# Patient Record
Sex: MALE | Race: WHITE | Employment: OTHER | URBAN - METROPOLITAN AREA
[De-identification: names, ages, dates, MRNs, and addresses within clinical notes are randomized per-mention and may not be internally consistent; named-entity substitution may affect disease eponyms.]

---

## 2017-11-13 ENCOUNTER — HOSPITAL ENCOUNTER (OUTPATIENT)
Dept: CARDIAC REHAB | Age: 77
Discharge: HOME | End: 2017-11-30
Attending: INTERNAL MEDICINE | Admitting: INTERNAL MEDICINE

## 2017-11-13 RX ORDER — CLOPIDOGREL BISULFATE 75 MG/1
75 TABLET ORAL DAILY
COMMUNITY

## 2017-11-13 RX ORDER — NIACIN 250 MG
250 TABLET ORAL
COMMUNITY

## 2017-11-13 NOTE — PROGRESS NOTES
[facility]  Phase 2 Cardiac Rehabilitation  Individualized Cardiac Treatment Plan    Patient Name:  Shilpa Adams :  1940 Age:  78 y.o. Account Number:  [de-identified]  Diagnosis:   CAD, PTCA, Stent  RCA - 100%    Date of Event: 2017  Physician:  Rajeev Arrieta      Next Office Visit:         Risk Stratifications: ()Low (   X )Intermediate ()High    FamilySophronia Rucks    [id number]  Allergies: Allergies   Allergen Reactions    Alcohol      Recovering  alcoholic    Azithromycin Nausea Only     Z-pack       . Primary Diagnosis    CAD, PTCA, Stent  And 100%   RCA    Cardiac History  History of symptoms related to cardiac event. (Note frequency, duration, location, radiation, quality, predisposing factors, other symptoms and what relieved symptoms)     Mr. Mallory Mireles presents today for initial evaulation into Cardiac Rehab phase 2 program.  His cardiac history is as follows: he had 3 small bouts of chest tightness that he did nothing about. All occurring about 3 weeks prior to event on 2017 when he was [place] the golf [place] and had chest pain ( mild burning sensation ) and was taken by a friend to the ED. By the time he presented to the hospital  The chest pain had resolved. Pt underwent coronary angiogram and was found to have high-grade RCA lesion which was treated with placement of drug- eluding stent. He was out playing golf again the following  Monday with no complaints.               []  MI              []  CABG         [x]  PTCA            []  Valve Replacement    []  Arrhythmia    []  CHF   Last Admission:   [] Pacemaker        []  ICD   []  Other     Ejection fraction         Physical Assessment -   Alert and oriented    General Appearance   Color: [x]  Natural [] Pale ( ) Cyanotic []  Flushed [] Jaundice   Skin Integrity:  Intact   Incision(s) where:  NONE Hx of infection at incision(s) site [x] No  [] Yes walked:       feet  Mets:  Max. HR.      BPM  RPE:   Rhythm:  % changed:     Fall Risk/Other  Fall risk assessed (x)yes   Issue:   NONE  Orthopedic problems ()yes ()no  Walker () Cane()   Safety issues reviewed  (x)yes   If patient has balance or orthopedic issue, action:      EXERCISE PLAN Exercise Plan EXERCISE PLAN EXERCISE PLAN EXERCISE PLAN   Interventions Interventions Interventions Interventions Interventions   Exercise Prescription/Order   Exercise Prescription/Order Exercise Prescription/order Exercise   Prescription/Order Discharge Exercise Plan                Mode       1. TM at 2.0  mph for 15-20  min at 2.5   mets  2. NS at   1-2 for 10 min at 2.0 mets  3. UBE at0-5  for 10 min   Mode      TM  NS  Ellipt/Arc  Bike  UBE  Scifit     Mode      TM  NS  Ellipt/Arc  Bike  UBE  Scifit     Mode      TM  NS  Ellipt/Arc  Bike  UBE  Scifit   Continue independent exercise [] Y    Continue in Phase IV [] Y    Aerobic Mode:     Frequency: 2-3  Week  Duration: 15-30 min  Intensity:  THR___or RPE 11-12     Frequency:   3 x week   Duration: 20-30 min  Intensity:   THR ___or RPE 11-13     Frequency: 3 x week  Duration: 20-40 min. Intensity:   THR ___ or RPE 11-14     Frequency: 3 x week  Duration: 30-45 min  Intensity:  THR ___or RPE 11-14 Frequency:      Exercise 3-5 days per week. [] yes[] no  []   30+ min. Of exercise per session    [] yes [] no     Progression:  Depending on patient condition, time and intensity will be increased. An initial met level< 3 is classified as \"light\". Progression to \"moderate\" 3-6 mets or higher for patients in better physical condition. Resistance Training  [] yes  [] no         Max. Met level:    Session #:    Resistance Training  [] yes  [] no  Type:    Symptoms with Exercise[] yes [] no Max. Met Level:    Session#:    Resistance Training  [] yes [] no  Type:    Symptoms with Exercise[] yes[] no   Max. Met.  Level:    Session #:    Resistance Training  [] yes [] no  Type:    Symptoms [] Normal  [x] Overweight ()Obese    [] Recent gain:    [] Recent loss:   Patients goal weight:   185.0    Weight Management  Weight:                         Weight Management  Weight:                       Weight   Management  Weight:         Weight Management  Weight:                  Height:    BMI:    Diet  Current Diet:  Low salt/  Low fat / low carb      Diet  Dietary Improvements:   Diabetes:   [x] Y  [] N   Type 2 Diet controlled takes no medications for it and does not check BS at home  FBS      [id number] /date  Checks BS at home   [] Y  [x] N  Frequency   Range   Medications  NONE  Low BS Reaction   []  To check BS first 6 sessions before/after exercise   []  To carry snack for low BS   Most recent BS    [] Y  [] N Any medication changes   [] Y  [] N Problems with low sugar or high sugars with exercise. Treatment: Most recent BS    [] Y  [] N Any medication changes Most recent BS    [] Y  [] N Any medication changes Most recent BS   [] Y  [] N Any medication changes   Lipids  Hyperlipidemia  [x] Y  [] N    09/21/2017  Total Chol: 166  HDL: 133  LDL: 116  Triglycerides: 85  Current Tx:   Lipitor  80 mg          [] Y [] N Medication changes? [] Y  [] N Recent Lipids   [] Y [] N Medication changes? [] Y [] N Recent Lipids  [] Y [] N Medication changes? [] Y [] N Recent Lipids  [] Y  [] N Medication changes? [] Y  [] N Recent lipids   Diet Assessment Tool:  Rate your plate survey  NDYYJ=80/78  Score of 55-69 is excellent. Diet Assessment Tool:  Rate your plate survey  Score:    /69  Score of 55-69 is excellent. NUTRITION PLAN NUTRITION PLAN NUTRITION PLAN NUTRITION PLAN NUTRITION PLAN   *Interventions* *Interventions* *Interventions* *Interventions* *Interventions*   Professional Referral  Please check if needed.    [] Dietician Consult    [] Diabetes Referral Professional Referral   []  Seen by dietician for   Consult/referral   []  Diabetes referral  [] Seen by dietician for consult/ referral   []  Seen for Diabetes Referral   Has patient completed consult if needed? [] Y [] N Met with dietician   [] Y  [] N Met with diabetes educator   Nutrition Education Nutrition Education Nutrition Education Nutrition Education Nutrition Education    [x] Individual Nutrition Counseling by staff/dietician    []  Individual Nutrition Counseling   []  Diabetic education if needed    Education Classes by dietician  1. []  Nutrients and Portion control  2. [] Fats & Fiber  3. []  Sodium, Dash & Mediterranean Diet               Education Classes by dietician  1. [] Nutrients & Portion Control  2. [] Fats & Fibers  3. []  Sodium, Dash and Mediterranean Diet           Education Classes by Dietician  1. [] Nutrients & Portion Control  2. [] Fat & Fibers  3. [] Sodium, Dash and Mediterranean Diet   Patient has knowledge of:   [] Y  [] N Heart Healthy diet   [] Y [] N Nutritional guidelines    [] Y  [] N Diabetic diet      Goals Goals Reassessed Goals Reassessed Goals Reassessed Goals   Initial Nutritional Goals Set (x)Yes  ()No Previous Nutritional Goals Met?  ()Yes  ()No Previous Nutritional Goals Met?  ()Yes  ()No Previous Nutritional Goals Met?  ()Yes  ()No Previous Nutritional Goals Met?  ()Yes  ()No   Janusz's nutritional goals are as follows: Individual goals -  1. Switch from general cold cuts to oven roasted turkey products. 2.   Eat 2-4 servings of fruits and vegs. Daily    3. Learn more about reading lables on packages to be more informed about sodium and fats in products. 4.  Eat a meatless main dish occasionally. Long term:  1. Improved Rate your plate score  2. Improved glucose control Review goals/ Revised:             Review goals/Revised:             Review goals/Revised:                   Long Term:  1. Improved Rate your plate score  [] yes  2.  Improved glucose control  [] yes   Individual Cardiac Treatment Plan  Psychosocial  PSYCHOSOCIAL  ASSESSMENT/PLAN PSYCHOSOCIAL  REASSESSMENT follows:    Pt. doesn't feel need to set these presently. Improved PHQ9 score  Improved Mental Health  Score               [] Improved PHQ9 score   [] Improved Mental Health score     Individual Cardiac Treatment Plan  Other:  Risk Factor/Education  CORE MEASURE  ASSESSMENT/PLAN CORE MEASURE  REASSESSMENT  CORE MEASURE  REASSESSMENT CORE MEASURE  REASSESSMENT CORE MEASURE  DISCHARGE/FOLLOW-UP   CORE MEASURE ASSESSMENT CORE MEASURE REASSESSMENT CORE MEASURE REASSESSMENT CORE MEASURE REASSESSMENT CORE MEASURE  Discharge   Hypertension   [x]Yes []No  Resting BP:  149/71  Peak Ex BP:163/74   See medication list.   Hypertension    Resting BP:   Peak Ex BP:  Medication Changes:  []Yes []No   Hypertension    Resting BP:   Peak Ex BP:  Medication Changes:  []Yes []No     Hypertension    Resting BP:   Peak Ex BP:  Medication Changes:  []Yes []No    Hypertension    Resting BP:   Peak Ex BP:  Medication Changes:  []Yes []No     Tobacco Use  []Current [x]Former []Never    Years smoked: 50 years   Date Quit: 02/19/2013  Quit date set: []Yes []No  Date:   # cigarettes one ppd for 50 years, quit when he found out he had lung cancer   :  Smokeless Tobacco use:   []Yes  [x]No  Amount:  Tobacco Use  Change in smoking status           Quit date:    Tobacco Use  Change in smoking status           Quit date:    Tobacco Use  Change in smoking status           Quit date:     Tobacco Use  Change in smoking status           Quit date:      Heart Health Knowledge   Test Score: 14 /15        Heart Health Knowledge   Test Score:    /15        Learning Barriers  Please select one:  []Speech  []Literacy  [] Hearing  []Cognitive  [] Vision  [x]Ready to Learn Learning Barriers addressed:[] Y[] N  Modifications:           Other Core Measures EDUCATION PLAN Other Core Measures EDUCATION PLAN Other Core Measures EDUCATION PLAN Other Core Measures EDUCATION PLAN Other Core Measure EDUCATION PLAN   Interventions Interventions Interventions Interventions Interventions   Cardiac Risk Factor Education Needed      [x]HTN              [] Attended Education Class on Risk Factors for Heart Disease  [] Home B/P monitoring  [] Dietary Sodium Restriction      []Attended Education Class on Risk Factors for Heart Disease  [] Home B/P monitoring  [] Dietary Sodium Restriction          []Attended Education Class on Risk Factors for Heart Disease    []Home B/P monitoring  [] Dietary Sodium Restriction  Janusz voices 1. Understanding of risks for heart disease and how to modify their risk. 2. Understanding of importance of restricting sodium in diet. []Y [x]N  Smoking Cessation counseling needed  []Y [x]N Pt verbalizes readiness to quit smoking?  []Y[x] () N Quit date set  []Y [x]N Cut down cigarettes  SMOKE FREE SINCE   02- []One on one counseling on Tobacco Cessation  [] Attend Smoking Cessation classes    []Smoking Cessation Information given  []Smoking cessation medications used:   [] One on one counseling on Tobacco Cessation. [] Attend smoking cessation classes      []Smoking cessation medications used:   [] One on one counseling on Tobacco Cessation. [] Attend smoking cessation classes        []Smoking cessation medications used: Tobacco Cessation Counseling attended  []Yes  []No  If not completed, Why? Core Measure Education Core Measure Education Core Measure Education Core Measure Education Education   [x] Cardiac Rehab Education booklet given  [x] Cardiac Rehab education class list given Attended Education Classes:  1. []  A & P of the  Heart & Body  2. []Heart Disease  3. [] Risk Factors  4. []  Cardiac Medications  5. [] Cardiac Interventions Attended Education Classes:  1. []  A & P of the  Heart & Body  2. [] Heart Disease  3. []  Risk Factors  4.[] ()  Cardiac Medications  5. [] Cardiac Interventions Attended Education Classes:  1. [] A & P of the  Heart & Body  2. []Heart Disease  3. [] Risk Factors  4. [] Cardiac Medications  5. []Cardiac Interventions Attend all the education classes. *Goals* Goal Reassessment Goal Reassessment Goal Reassessment *Goals*   Amelia Initial Risk factor/education  goals are as follows:  1. To feel stronger and increase energy. 2.  Continue his home exercise program of walking a mile a day  2-3 times per week. 3.  To golf when ever he can. Resting B/P < 140/90 or 130/80 if DM or CKD  [] Y [x]N Complete tobacco cessation  [x]Y []N Understanding risks of heart disease  []Y [x]N Heart failure self management     Goal Progress:    Goal Progress:     Goal Progress: Carlos Mcclure has met goals ()yes         Physician Response  [x]Initiate Individualized Treatment Plan (ITP)  []Other   Physician Response  [] Proceed with rehab and 30 day updates per ITP. []Other     Physician Response  [] Proceed with rehab and 30 day updates per ITP. []Other   Physician Response  [] Proceed with rehab and 30 day updates per ITP. []Other    Physician Final Signature     Comments:-    11-  Initial evaluation for Cardiac rehab phase 2. Mr. 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 While walking on the golf course he developed mild left chest pain described as a dull burning sensation and numbness and discomfort in his left arm. He when back to the car and by the time he got to hospital the pain was resolved. 9-  Went to coronary angiogram and had high-grade RCA lesion which was treated with placement of drug-eluting stent. He has done well since with no further symptoms. He underwent a 6 minute walk for 1250 feet and tolerated it well with telemetry showing SR. No dyspnea, dizziness or discomfort.   Will provide education about risk factor modification and support toward goal attainment. Will continue to progress exercise as tolerated.   Rachel Mena RN  11/13/2017      Physician's Signature: _________________ Date:________

## 2017-12-01 ENCOUNTER — HOSPITAL ENCOUNTER (OUTPATIENT)
Dept: OTHER | Age: 77
Discharge: HOME | End: 2017-12-31
Attending: INTERNAL MEDICINE | Admitting: INTERNAL MEDICINE

## 2018-01-01 ENCOUNTER — HOSPITAL ENCOUNTER (OUTPATIENT)
Dept: OTHER | Age: 78
Discharge: HOME | End: 2018-01-31
Attending: INTERNAL MEDICINE | Admitting: INTERNAL MEDICINE

## 2018-02-01 ENCOUNTER — HOSPITAL ENCOUNTER (OUTPATIENT)
Dept: OTHER | Age: 78
Discharge: HOME | End: 2018-02-28
Attending: INTERNAL MEDICINE | Admitting: INTERNAL MEDICINE

## 2018-03-01 ENCOUNTER — HOSPITAL ENCOUNTER (OUTPATIENT)
Dept: OTHER | Age: 78
Discharge: HOME | End: 2018-03-31
Attending: INTERNAL MEDICINE | Admitting: INTERNAL MEDICINE

## 2024-05-07 DIAGNOSIS — M25.561 RIGHT KNEE PAIN, UNSPECIFIED CHRONICITY: Primary | ICD-10-CM

## 2024-05-08 ENCOUNTER — HOSPITAL ENCOUNTER (OUTPATIENT)
Age: 84
Discharge: HOME | End: 2024-05-08
Payer: MEDICARE

## 2024-05-08 ENCOUNTER — OFFICE VISIT (OUTPATIENT)
Age: 84
End: 2024-05-08
Payer: MEDICARE

## 2024-05-08 VITALS — HEIGHT: 71 IN | WEIGHT: 195 LBS | BODY MASS INDEX: 27.3 KG/M2

## 2024-05-08 DIAGNOSIS — M25.561 RIGHT KNEE PAIN, UNSPECIFIED CHRONICITY: ICD-10-CM

## 2024-05-08 DIAGNOSIS — M17.11 PRIMARY OSTEOARTHRITIS OF RIGHT KNEE: Primary | ICD-10-CM

## 2024-05-08 PROCEDURE — G8427 DOCREV CUR MEDS BY ELIG CLIN: HCPCS | Performed by: ORTHOPAEDIC SURGERY

## 2024-05-08 PROCEDURE — 73564 X-RAY EXAM KNEE 4 OR MORE: CPT

## 2024-05-08 PROCEDURE — 20610 DRAIN/INJ JOINT/BURSA W/O US: CPT | Performed by: ORTHOPAEDIC SURGERY

## 2024-05-08 PROCEDURE — 1123F ACP DISCUSS/DSCN MKR DOCD: CPT | Performed by: ORTHOPAEDIC SURGERY

## 2024-05-08 PROCEDURE — 1036F TOBACCO NON-USER: CPT | Performed by: ORTHOPAEDIC SURGERY

## 2024-05-08 PROCEDURE — 99203 OFFICE O/P NEW LOW 30 MIN: CPT | Performed by: ORTHOPAEDIC SURGERY

## 2024-05-08 PROCEDURE — G8419 CALC BMI OUT NRM PARAM NOF/U: HCPCS | Performed by: ORTHOPAEDIC SURGERY

## 2024-05-08 RX ORDER — ALBUTEROL SULFATE 90 UG/1
2 AEROSOL, METERED RESPIRATORY (INHALATION) EVERY 6 HOURS PRN
COMMUNITY
Start: 2024-02-09

## 2024-05-08 RX ORDER — ACETAMINOPHEN 500 MG
1500 TABLET ORAL NIGHTLY
COMMUNITY
Start: 2019-09-26

## 2024-05-08 RX ORDER — PEAK FLOW METER
EACH MISCELLANEOUS
COMMUNITY
Start: 2024-02-12

## 2024-05-08 RX ORDER — DEXAMETHASONE 4 MG/1
TABLET ORAL
COMMUNITY
Start: 2024-02-08 | End: 2024-05-22

## 2024-05-08 RX ORDER — BETAMETHASONE SODIUM PHOSPHATE AND BETAMETHASONE ACETATE 3; 3 MG/ML; MG/ML
12 INJECTION, SUSPENSION INTRA-ARTICULAR; INTRALESIONAL; INTRAMUSCULAR; SOFT TISSUE ONCE
Status: COMPLETED | OUTPATIENT
Start: 2024-05-08 | End: 2024-05-08

## 2024-05-08 RX ORDER — CEFDINIR 300 MG/1
CAPSULE ORAL
COMMUNITY
Start: 2024-02-09

## 2024-05-08 RX ORDER — DOXYCYCLINE HYCLATE 100 MG
TABLET ORAL
COMMUNITY
Start: 2024-02-19

## 2024-05-08 RX ORDER — IPRATROPIUM BROMIDE AND ALBUTEROL SULFATE 2.5; .5 MG/3ML; MG/3ML
3 SOLUTION RESPIRATORY (INHALATION) EVERY 4 HOURS PRN
COMMUNITY
Start: 2024-04-24

## 2024-05-08 RX ORDER — LISINOPRIL AND HYDROCHLOROTHIAZIDE 20; 12.5 MG/1; MG/1
1 TABLET ORAL 2 TIMES DAILY
COMMUNITY
Start: 2019-09-26

## 2024-05-08 RX ORDER — METHYLPREDNISOLONE 4 MG/1
TABLET ORAL
COMMUNITY
Start: 2024-02-19

## 2024-05-08 RX ORDER — LIDOCAINE HYDROCHLORIDE 10 MG/ML
5 INJECTION, SOLUTION INFILTRATION; PERINEURAL ONCE
Status: COMPLETED | OUTPATIENT
Start: 2024-05-08 | End: 2024-05-08

## 2024-05-08 RX ORDER — FOLIC ACID 1 MG/1
1 TABLET ORAL DAILY
COMMUNITY
Start: 2023-09-20

## 2024-05-08 RX ORDER — CLOTRIMAZOLE AND BETAMETHASONE DIPROPIONATE 10; .64 MG/G; MG/G
1 CREAM TOPICAL
COMMUNITY
Start: 2024-04-08

## 2024-05-08 RX ORDER — ATORVASTATIN CALCIUM 80 MG/1
80 TABLET, FILM COATED ORAL NIGHTLY
COMMUNITY
Start: 2024-03-16

## 2024-05-08 RX ADMIN — BETAMETHASONE SODIUM PHOSPHATE AND BETAMETHASONE ACETATE 12 MG: 3; 3 INJECTION, SUSPENSION INTRA-ARTICULAR; INTRALESIONAL; INTRAMUSCULAR; SOFT TISSUE at 15:47

## 2024-05-08 RX ADMIN — LIDOCAINE HYDROCHLORIDE 5 ML: 10 INJECTION, SOLUTION INFILTRATION; PERINEURAL at 15:47

## 2024-05-08 NOTE — PROGRESS NOTES
mouth daily      ipratropium 0.5 mg-albuterol 2.5 mg (DUONEB) 0.5-2.5 (3) MG/3ML SOLN nebulizer solution Inhale 3 mLs into the lungs every 4 hours as needed      lisinopril-hydroCHLOROthiazide (PRINZIDE;ZESTORETIC) 20-12.5 MG per tablet Take 1 tablet by mouth 2 times daily      methylPREDNISolone (MEDROL DOSEPACK) 4 MG tablet FOLLOW PACKAGE DIRECTIONS      Nebulizers (VIOS AEROSOL DELIVERY SYSTEM) MISC USE AS DIRECTED      Multiple Vitamin (MULTIVITAMINS PO) Take by mouth      niacin 250 MG tablet Take 1 tablet by mouth      aspirin 81 MG tablet Take 1 tablet by mouth daily      clopidogrel (PLAVIX) 75 MG tablet Take 1 tablet by mouth daily      metoprolol tartrate (LOPRESSOR) 25 MG tablet Take 1 tablet by mouth 2 times daily      ascorbic acid (VITAMIN C) 500 MG tablet Take 1 tablet by mouth daily      gemfibrozil (LOPID) 600 MG tablet Take 1 tablet by mouth 2 times daily (before meals)      pantoprazole sodium (PROTONIX) 40 MG PACK packet Take 1 packet by mouth in the morning and 1 packet in the evening.      tiotropium (SPIRIVA) 18 MCG inhalation capsule Inhale 1 capsule into the lungs daily      temazepam (RESTORIL) 15 MG capsule Take 1 capsule by mouth nightly as needed for Sleep.       No current facility-administered medications for this visit.      allergies, social and family histories were reviewed and updated as appropriate.    Review of Systems:  Relevant review of systems reviewed and available in the patient's chart and scanned in under the MEDIA tab today.    Vital Signs:  Ht 1.803 m (5' 11\")   Wt 88.5 kg (195 lb)   BMI 27.20 kg/m²     General Exam:   Constitutional: He is adequately groomed with no evidence of malnutrition, obesity absent  Mental Status: He is oriented to time, place and person. Normal mentation and affect for age.  Lymphatic: The lymphatic examination bilaterally reveals all areas to be without enlargement or induration.  Vascular: Examination reveals no swelling or calf

## 2024-05-31 ENCOUNTER — OFFICE VISIT (OUTPATIENT)
Dept: ORTHOPEDIC SURGERY | Age: 84
End: 2024-05-31

## 2024-05-31 DIAGNOSIS — M17.11 PRIMARY OSTEOARTHRITIS OF RIGHT KNEE: Primary | ICD-10-CM

## 2024-05-31 RX ORDER — LIDOCAINE HYDROCHLORIDE 10 MG/ML
5 INJECTION, SOLUTION INFILTRATION; PERINEURAL ONCE
Status: COMPLETED | OUTPATIENT
Start: 2024-05-31 | End: 2024-05-31

## 2024-05-31 RX ORDER — METHYLPREDNISOLONE 4 MG/1
TABLET ORAL
Qty: 1 KIT | Refills: 0 | Status: SHIPPED | OUTPATIENT
Start: 2024-05-31

## 2024-05-31 RX ORDER — BETAMETHASONE SODIUM PHOSPHATE AND BETAMETHASONE ACETATE 3; 3 MG/ML; MG/ML
12 INJECTION, SUSPENSION INTRA-ARTICULAR; INTRALESIONAL; INTRAMUSCULAR; SOFT TISSUE ONCE
Status: COMPLETED | OUTPATIENT
Start: 2024-05-31 | End: 2024-05-31

## 2024-05-31 RX ADMIN — BETAMETHASONE SODIUM PHOSPHATE AND BETAMETHASONE ACETATE 12 MG: 3; 3 INJECTION, SUSPENSION INTRA-ARTICULAR; INTRALESIONAL; INTRAMUSCULAR; SOFT TISSUE at 10:37

## 2024-05-31 RX ADMIN — LIDOCAINE HYDROCHLORIDE 5 ML: 10 INJECTION, SOLUTION INFILTRATION; PERINEURAL at 10:36

## 2024-06-01 NOTE — PROGRESS NOTES
Janusz Childs  [id number]  Encounter Date: 5/31/2024  YOB: 1940    Chief Complaint   Patient presents with    Follow-up     Right knee pain       History:Mr. Janusz Childs is here in follow up regarding his right knee pain.  He feels like the injection from May 8 significantly helped for about 2 weeks but has started to wear off.  Pain level was up to a 5/10 with activity now.  He has a fishing trip that is a flight and trip to [place] coming up next week and he is requesting a repeat injection to help get through that trip.  No new injuries since his last visit.    Exam:  There were no vitals taken for this visit.  General: Alert and oriented x3, No acute distress, Cooperative and conversant.  Mood and affect are appropriate.  Right Knee Examination:     Inspection:  Knee shows mild varus alignment  Normal muscle bulk and tone for his age and activity level.  No erythema or significant effusion.     Palpation: Tender to palpation along the joint line medially and laterally with trace of crepitation felt with range of motion.  No significant warmth to palpation.     Range of Motion: 0 to 120 with mild pain     Strength:  Quad 5/ 5  ; Hamstrings 5/ 5.  Gross motor to hip and ankle intact, no pain with logroll the hip.     Sensation to light touch grossly present throughout the right lower extremity  Capillary refill < 2 seconds and palpable distal pulses  Skin: no erythema, lesions or rashes    Assessment:   Diagnosis Orders   1. Primary osteoarthritis of right knee  FL ARTHROCENTESIS ASPIR&/INJ MAJOR JT/BURSA W/O US    betamethasone acetate-betamethasone sodium phosphate (CELESTONE) injection 12 mg    lidocaine 1 % injection 5 mL          Orders  Orders Placed This Encounter   Procedures    FL ARTHROCENTESIS ASPIR&/INJ MAJOR JT/BURSA W/O US       Plan:  We discussed treatment options today.  Explained to him we usually wait 3 months between injections but he has other chronic comorbidities that

## 2024-12-06 ENCOUNTER — OFFICE VISIT (OUTPATIENT)
Dept: ORTHOPEDIC SURGERY | Age: 84
End: 2024-12-06

## 2024-12-06 VITALS — HEIGHT: 71 IN | WEIGHT: 184 LBS | BODY MASS INDEX: 25.76 KG/M2

## 2024-12-06 DIAGNOSIS — M17.11 PRIMARY OSTEOARTHRITIS OF RIGHT KNEE: Primary | ICD-10-CM

## 2024-12-06 RX ORDER — BETAMETHASONE SODIUM PHOSPHATE AND BETAMETHASONE ACETATE 3; 3 MG/ML; MG/ML
12 INJECTION, SUSPENSION INTRA-ARTICULAR; INTRALESIONAL; INTRAMUSCULAR; SOFT TISSUE ONCE
Status: COMPLETED | OUTPATIENT
Start: 2024-12-06 | End: 2024-12-06

## 2024-12-06 RX ORDER — LIDOCAINE HYDROCHLORIDE 10 MG/ML
5 INJECTION, SOLUTION INFILTRATION; PERINEURAL ONCE
Status: COMPLETED | OUTPATIENT
Start: 2024-12-06 | End: 2024-12-06

## 2024-12-06 RX ADMIN — BETAMETHASONE SODIUM PHOSPHATE AND BETAMETHASONE ACETATE 12 MG: 3; 3 INJECTION, SUSPENSION INTRA-ARTICULAR; INTRALESIONAL; INTRAMUSCULAR; SOFT TISSUE at 11:52

## 2024-12-06 RX ADMIN — LIDOCAINE HYDROCHLORIDE 5 ML: 10 INJECTION, SOLUTION INFILTRATION; PERINEURAL at 11:53

## 2024-12-07 NOTE — PROGRESS NOTES
Janusz Childs  [id number]  Encounter Date: 12/6/2024  YOB: 1940    Chief Complaint   Patient presents with    Follow-up     Right knee       History:Mr. Janusz Childs is here in follow up regarding his right knee arthritis pain.  Pain level is back up to about 5/10 on average after his injection 5/31/24.  Denies new injuries.  Pain does reach a level where it reduces his activity and requires time to rest.    Exam:  Ht 1.803 m (5' 11\")   Wt 83.5 kg (184 lb)   BMI 25.66 kg/m²   General: Alert and oriented x3, No acute distress, Cooperative and conversant.  Right Knee Examination:  Inspection:  Knee shows mild varus alignment  Normal muscle bulk and tone for his age and activity level.  No erythema or significant effusion.   Palpation: Tender to palpation along the joint line medially and laterally with trace of crepitation felt with range of motion.  No significant warmth to palpation.   Range of Motion: 0 to 120 with mild pain   Strength:  Quad 5/ 5  ; Hamstrings 5/ 5.    Sensation to light touch grossly present throughout the right lower extremity  Capillary refill < 2 seconds   Skin: no erythema, lesions or rashes    Assessment:   Diagnosis Orders   1. Primary osteoarthritis of right knee  betamethasone acetate-betamethasone sodium phosphate (CELESTONE) injection 12 mg    lidocaine 1 % injection 5 mL    DRAIN/INJECT LARGE JOINT/BURSA          Orders  Orders Placed This Encounter   Procedures    DRAIN/INJECT LARGE JOINT/BURSA       Plan:  We discussed treatment options today.   We will go ahead and repeat the cortisone injection today including risks and benefits. Right knee was again prepped with Betadine for an inferior lateral approach. 2 cc of betamethasone mixed with 5 cc 1% lidocaine plain were carefully aspirated and injected into the intra-articular space under aseptic technique. He tolerated this well. Needle was withdrawn intact and a Band-Aid was applied. He will follow up as

## 2025-07-18 ENCOUNTER — APPOINTMENT (OUTPATIENT)
Dept: CT IMAGING | Age: 85
End: 2025-07-18
Payer: MEDICARE

## 2025-07-18 ENCOUNTER — HOSPITAL ENCOUNTER (EMERGENCY)
Age: 85
Discharge: HOME | End: 2025-07-18
Payer: MEDICARE

## 2025-07-18 VITALS
BODY MASS INDEX: 26.33 KG/M2 | HEART RATE: 76 BPM | TEMPERATURE: 97.1 F | WEIGHT: 188.05 LBS | DIASTOLIC BLOOD PRESSURE: 61 MMHG | SYSTOLIC BLOOD PRESSURE: 145 MMHG | OXYGEN SATURATION: 98 % | RESPIRATION RATE: 18 BRPM | HEIGHT: 71 IN

## 2025-07-18 DIAGNOSIS — S51.812A SKIN TEAR OF LEFT FOREARM WITHOUT COMPLICATION, INITIAL ENCOUNTER: ICD-10-CM
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
## 2025-07-18 DIAGNOSIS — S51.811A SKIN TEAR OF RIGHT FOREARM WITHOUT COMPLICATION, INITIAL ENCOUNTER: Primary | ICD-10-CM

## 2025-07-18 PROCEDURE — 99284 EMERGENCY DEPT VISIT MOD MDM: CPT

## 2025-07-18 PROCEDURE — 70450 CT HEAD/BRAIN W/O DYE: CPT

## 2025-07-18 ASSESSMENT — PAIN - FUNCTIONAL ASSESSMENT: PAIN_FUNCTIONAL_ASSESSMENT: NONE - DENIES PAIN

## 2025-07-18 NOTE — DISCHARGE INSTRUCTIONS
Clean with soap and water only.  Leave dressings on over the next 24 hours.  You can then remove and then apply nonadherent dressings to the wounds as needed.  Follow-up with your primary care physician for further evaluation.  If you start to develop any new or worsening symptoms please return to the emergency department

## 2025-07-19 NOTE — ED NOTES
Dressing applied to L arm, covered with acewrap. Pulses checked. Pt tolerated well. Education on wear provided verbally, and written on D/C paperwork. Pt and spouse state understanding

## 2025-07-21 NOTE — ED PROVIDER NOTES
of Record.  FINAL IMPRESSION      1. Skin tear of right forearm without complication, initial encounter    2. Skin tear of left forearm without complication, initial encounter    3. Fall, initial encounter          DISPOSITION/PLAN     DISPOSITION Decision To Discharge 07/18/2025 07:43:59 PM   DISPOSITION CONDITION Stable           PATIENT REFERRED TO:  Rob Juárez Jr., MD  [address]  [facility] [id number] [phone]    Schedule an appointment as soon as possible for a visit in 1 day  for reevaluation    [facility] Emergency Department  [address] [address]  [phone]  Go to   As needed, If symptoms worsen      DISCHARGE MEDICATIONS:  Discharge Medication List as of 7/18/2025  8:13 PM          DISCONTINUED MEDICATIONS:  Discharge Medication List as of 7/18/2025  8:13 PM                 (Please note that portions of this note were completed with a voice recognition program.  Efforts were made to edit the dictations but occasionally words are mis-transcribed.)    Celia Knight PA-C (electronically signed)        Celia Knight PA-C  07/21/25 [id number]

## 2025-08-22 ENCOUNTER — OFFICE VISIT (OUTPATIENT)
Dept: ORTHOPEDIC SURGERY | Age: 85
End: 2025-08-22

## 2025-08-22 VITALS — BODY MASS INDEX: 25.76 KG/M2 | HEIGHT: 71 IN | WEIGHT: 184 LBS

## 2025-08-22 DIAGNOSIS — M17.11 PRIMARY OSTEOARTHRITIS OF RIGHT KNEE: Primary | ICD-10-CM

## 2025-08-22 RX ORDER — LIDOCAINE HYDROCHLORIDE 10 MG/ML
5 INJECTION, SOLUTION INFILTRATION; PERINEURAL ONCE
Status: COMPLETED | OUTPATIENT
Start: 2025-08-22 | End: 2025-08-22

## 2025-08-22 RX ORDER — BETAMETHASONE SODIUM PHOSPHATE AND BETAMETHASONE ACETATE 3; 3 MG/ML; MG/ML
12 INJECTION, SUSPENSION INTRA-ARTICULAR; INTRALESIONAL; INTRAMUSCULAR; SOFT TISSUE ONCE
Status: COMPLETED | OUTPATIENT
Start: 2025-08-22 | End: 2025-08-22

## 2025-08-22 RX ADMIN — LIDOCAINE HYDROCHLORIDE 5 ML: 10 INJECTION, SOLUTION INFILTRATION; PERINEURAL at 10:19

## 2025-08-22 RX ADMIN — BETAMETHASONE SODIUM PHOSPHATE AND BETAMETHASONE ACETATE 12 MG: 3; 3 INJECTION, SUSPENSION INTRA-ARTICULAR; INTRALESIONAL; INTRAMUSCULAR; SOFT TISSUE at 10:18
